# Patient Record
Sex: MALE | Race: OTHER | NOT HISPANIC OR LATINO
[De-identification: names, ages, dates, MRNs, and addresses within clinical notes are randomized per-mention and may not be internally consistent; named-entity substitution may affect disease eponyms.]

---

## 2020-01-13 PROBLEM — Z00.00 ENCOUNTER FOR PREVENTIVE HEALTH EXAMINATION: Status: ACTIVE | Noted: 2020-01-13

## 2020-01-14 ENCOUNTER — APPOINTMENT (OUTPATIENT)
Dept: ORTHOPEDIC SURGERY | Facility: CLINIC | Age: 65
End: 2020-01-14
Payer: COMMERCIAL

## 2020-01-14 VITALS — WEIGHT: 210 LBS | BODY MASS INDEX: 29.4 KG/M2 | HEIGHT: 71 IN

## 2020-01-14 DIAGNOSIS — Z86.69 PERSONAL HISTORY OF OTHER DISEASES OF THE NERVOUS SYSTEM AND SENSE ORGANS: ICD-10-CM

## 2020-01-14 DIAGNOSIS — Z82.61 FAMILY HISTORY OF ARTHRITIS: ICD-10-CM

## 2020-01-14 DIAGNOSIS — Z86.39 PERSONAL HISTORY OF OTHER ENDOCRINE, NUTRITIONAL AND METABOLIC DISEASE: ICD-10-CM

## 2020-01-14 DIAGNOSIS — Z87.39 PERSONAL HISTORY OF OTHER DISEASES OF THE MUSCULOSKELETAL SYSTEM AND CONNECTIVE TISSUE: ICD-10-CM

## 2020-01-14 DIAGNOSIS — Z72.3 LACK OF PHYSICAL EXERCISE: ICD-10-CM

## 2020-01-14 PROCEDURE — 99215 OFFICE O/P EST HI 40 MIN: CPT

## 2020-01-14 PROCEDURE — 72100 X-RAY EXAM L-S SPINE 2/3 VWS: CPT

## 2020-01-14 RX ORDER — METFORMIN HYDROCHLORIDE 1000 MG/1
1000 TABLET, COATED ORAL
Refills: 0 | Status: ACTIVE | COMMUNITY

## 2020-01-14 RX ORDER — SITAGLIPTIN 50 MG/1
50 TABLET, FILM COATED ORAL
Refills: 0 | Status: ACTIVE | COMMUNITY

## 2020-01-14 RX ORDER — MELOXICAM 7.5 MG/1
7.5 TABLET ORAL
Refills: 0 | Status: ACTIVE | COMMUNITY

## 2020-01-14 RX ORDER — GABAPENTIN 100 MG/1
CAPSULE ORAL
Refills: 0 | Status: ACTIVE | COMMUNITY

## 2020-01-14 NOTE — PHYSICAL EXAM
[de-identified] : General: No acute distress, conversant, well-nourished.\par Head: Normocephalic, atraumatic\par Neck: trachea midline, FROM\par Heart: normotensive and normal rate and rhythm\par Lungs: No labored breathing\par Skin: No abrasions, no rashes, no edema\par Psych: Alert and oriented to person, place and time\par Extremities: no peripheral edema or digital cyanosis\par Gait: Normal gait. Can perform tandem gait.  \par Vascular: warm and well perfused distally, palpable distal pulses\par \par MSK:\par Lumbar spine:\par Alignment normal.\par No tenderness to palpation.  No step-off, no deformity.\par \par NEURO EXAM:\par Sensation \par Left L2  -  2/2            \par Left L3  -  2/2\par Left L4  -  2/2\par Left L5  -  2/2\par Left S1  -  2/2\par \par Right L2  -  2/2            \par Right L3  -  2/2\par Right L4  -  2/2\par Right L5  -  2/2\par Right S1  -  2/2\par \par Motor: \par Left L2 (hip flexion)                            5/5                \par Left L3 (knee extension)                   5/5                \par Left L4 (ankle dorsiflexion)                 5/5                \par Left L5 (long toe extensor)                5/5                \par Left S1 (ankle plantar flexion)           5/5\par \par Right L2 (hip flexion)                            5/5                \par Right L3 (knee extension)                   5/5                \par Right L4 (ankle dorsiflexion)                 5/5                \par Right L5 (long toe extensor)                5/5                \par Right S1 (ankle plantar flexion)           5/5\par \par Reflexes: Normal and symmetric\par Negative clonus.  Down-going Babinski.  \par \par  [de-identified] : Lumbar radiographs obtained in the office today shows no fracture or dislocation.  Lumbar spondylosis.  L5-S1 loss of disc height.

## 2020-01-14 NOTE — HISTORY OF PRESENT ILLNESS
[de-identified] : 64 year old male with low back pain that radiates into his bilateral thighs. He says it is worse in the morning but the pain is constant.  He also has pain and paresthesias that radiating down the back of his legs.  He was previously treated by his PCP who put him on Meloxicam which he says doesn't help.  He finds the pain is limiting his mobility.  He says it has been going on for about a year but has gotten progressively worse.

## 2020-01-14 NOTE — ASSESSMENT
[FreeTextEntry1] : 64 year old male with symptoms consistent with neurogenic claudication and lumbar radiculopathy.  Patient has tried meloxicam without success.  He will stop the meloxicam and start diclofenac instead.  He was given a referral for physical therapy.  He was given a referral for a lumbar MRI.  He will followup once the MRI has been completed.  He knows to call with any questions or concerns or if his symptoms acutely worsen.

## 2020-01-31 ENCOUNTER — APPOINTMENT (OUTPATIENT)
Dept: ORTHOPEDIC SURGERY | Facility: CLINIC | Age: 65
End: 2020-01-31
Payer: COMMERCIAL

## 2020-01-31 PROCEDURE — 99213 OFFICE O/P EST LOW 20 MIN: CPT

## 2020-01-31 PROCEDURE — 72100 X-RAY EXAM L-S SPINE 2/3 VWS: CPT

## 2020-02-10 ENCOUNTER — RX RENEWAL (OUTPATIENT)
Age: 65
End: 2020-02-10

## 2020-03-13 ENCOUNTER — RX RENEWAL (OUTPATIENT)
Age: 65
End: 2020-03-13

## 2020-04-10 ENCOUNTER — RX RENEWAL (OUTPATIENT)
Age: 65
End: 2020-04-10

## 2020-04-10 RX ORDER — DICLOFENAC SODIUM 75 MG/1
75 TABLET, DELAYED RELEASE ORAL
Qty: 60 | Refills: 0 | Status: ACTIVE | COMMUNITY
Start: 2020-01-14 | End: 1900-01-01

## 2021-04-05 ENCOUNTER — APPOINTMENT (OUTPATIENT)
Dept: ORTHOPEDIC SURGERY | Facility: CLINIC | Age: 66
End: 2021-04-05
Payer: COMMERCIAL

## 2021-04-05 VITALS — HEIGHT: 60 IN | WEIGHT: 211 LBS | BODY MASS INDEX: 41.43 KG/M2

## 2021-04-05 PROCEDURE — 73521 X-RAY EXAM HIPS BI 2 VIEWS: CPT

## 2021-04-05 PROCEDURE — 99072 ADDL SUPL MATRL&STAF TM PHE: CPT

## 2021-04-05 PROCEDURE — 99214 OFFICE O/P EST MOD 30 MIN: CPT

## 2021-04-05 RX ORDER — LIDOCAINE 4 G/100G
4 PATCH TOPICAL
Qty: 30 | Refills: 0 | Status: ACTIVE | COMMUNITY
Start: 2020-12-21

## 2021-04-05 RX ORDER — METHYLPREDNISOLONE 4 MG/1
4 TABLET ORAL
Qty: 1 | Refills: 0 | Status: ACTIVE | COMMUNITY
Start: 2021-04-05 | End: 1900-01-01

## 2021-04-05 RX ORDER — MELOXICAM 15 MG/1
15 TABLET ORAL
Qty: 30 | Refills: 0 | Status: ACTIVE | COMMUNITY
Start: 2020-12-21

## 2021-04-05 RX ORDER — SITAGLIPTIN 100 MG/1
100 TABLET, FILM COATED ORAL
Qty: 30 | Refills: 0 | Status: ACTIVE | COMMUNITY
Start: 2021-01-09

## 2021-04-05 RX ORDER — LISINOPRIL 20 MG/1
20 TABLET ORAL
Qty: 30 | Refills: 0 | Status: ACTIVE | COMMUNITY
Start: 2021-01-09

## 2021-04-05 NOTE — HISTORY OF PRESENT ILLNESS
[de-identified] : First visit for this gentleman he is complaining of chronic low back pain with radiating heaviness and numbness in her left thigh. This ongoing for a few years he is not certain that this issue. She specifically has an

## 2021-04-05 NOTE — DISCUSSION/SUMMARY
[Medication Risks Reviewed] : Medication risks reviewed [de-identified] : Patient's clinical exam history and radiographs are consistent with spinal stenosis. We talked about options a Medrol Dosepak Radha is discussed he does not improve I recommended he return for the neurosurgeon.

## 2021-04-05 NOTE — PHYSICAL EXAM
[de-identified] : On exam today patient's full passive internal and external rotation of both hips at 90° with no restriction or pain. He is neurovascularly intact distally negative straight leg test quad tone is good. A lumbar exam patient does have some mild paraspinal tenderness to the lower lumbar segments. [de-identified] : AP and lateral of both hips were obtained show well-preserved joint spaces no bony abnormalities. Cartilage is well maintained

## 2021-04-05 NOTE — REASON FOR VISIT
[Follow-Up Visit] : a follow-up visit for [FreeTextEntry2] : ALEJANDRO HIP PAIN AND CRAMPS DOWN LEGS AND THIGH - SAW DR. LEÓN LAST YEAR FOR LUMBAR SPINE - SENT FOR HIP XRAYS

## 2021-05-06 ENCOUNTER — APPOINTMENT (OUTPATIENT)
Dept: ORTHOPEDIC SURGERY | Facility: CLINIC | Age: 66
End: 2021-05-06
Payer: COMMERCIAL

## 2021-05-06 PROCEDURE — 99072 ADDL SUPL MATRL&STAF TM PHE: CPT

## 2021-05-06 PROCEDURE — 99214 OFFICE O/P EST MOD 30 MIN: CPT

## 2021-05-06 RX ORDER — PREGABALIN 75 MG/1
75 CAPSULE ORAL
Qty: 60 | Refills: 1 | Status: ACTIVE | COMMUNITY
Start: 2021-05-06 | End: 1900-01-01

## 2021-05-06 NOTE — PHYSICAL EXAM
[de-identified] : General: No acute distress, conversant, well-nourished.\par Head: Normocephalic, atraumatic\par Neck: trachea midline, FROM\par Heart: normotensive and normal rate and rhythm\par Lungs: No labored breathing\par Skin: No abrasions, no rashes, no edema\par Psych: Alert and oriented to person, place and time\par Extremities: no peripheral edema or digital cyanosis\par Gait: Normal gait. Can perform tandem gait. \par Vascular: warm and well perfused distally, palpable distal pulses\par \par MSK:\par Lumbar spine:\par No tenderness to palpation. No step-off, no deformity.\par \par NEURO EXAM:\par Sensation \par Left L2 - 2/2 \par Left L3 - 2/2\par Left L4 - 2/2\par Left L5 - 2/2\par Left S1 - 2/2\par \par Right L2 - 2/2 \par Right L3 - 2/2\par Right L4 - 2/2\par Right L5 - 2/2\par Right S1 - 2/2\par \par Motor: \par Left L2 (hip flexion)  5/5 \par Left L3 (knee extension)  5/5 \par Left L4 (ankle dorsiflexion)  5/5 \par Left L5 (long toe extensor) 5/5 \par Left S1 (ankle plantar flexion) 5/5\par \par Right L2 (hip flexion)  5/5 \par Right L3 (knee extension)  5/5 \par Right L4 (ankle dorsiflexion)  5/5 \par Right L5 (long toe extensor) 5/5 \par Right S1 (ankle plantar flexion) 5/5\par \par Reflexes: Normal and symmetric\par Negative clonus. Down-going Babinski.  [de-identified] : Lumbar MRI shows lumbar spondylosis.  L3-L4 moderate-severe central stenosis.  \par \par Lumbar flex and extension radiographs obtained in the office today shows no dynamic instability. There is lumbar spondylosis and loss of disc height at L5-S1.

## 2021-05-06 NOTE — HISTORY OF PRESENT ILLNESS
[de-identified] : 64 year old male followup for acute exacerbation of low back pain radiating into his lower extremities.  Since his last visit he reports that the pain radiating into his thighs has worsened.  The back pain doesn't bother him but the leg pain is interfering with his quality of life due to its severity.  It is worse with walking and he says he has to push through the pain.  He denies radicular pain, recent illness, fevers, numbness, weakness, balance problems, saddle anesthesia, urinary retention or fecal incontinence.  He has tried PT and medications (including Diclofenac and gabapentin) without relief.  He would like definitive management. \par PCP: Swapna Alba NP\par PCP: Dr Tevin Metcalf

## 2021-05-06 NOTE — ASSESSMENT
[FreeTextEntry1] : 64 year old male followup for acute exacerbation of low back pain radiating into his thighs.  He has L3-L4 lumbar stenosis and neurogenic claudication.  He is otherwise neurologically intact.  Since his last visit he has had significant worsening of the pain radiating to his thighs.  It is worse with walking and better with rest. He has minimal low back pain.  He has tried PT, Diclofenac and gabapentin without relief.  We discussed additional treatment options including spinal injections, medications and surgery.  Surgery would be L3-L4 laminectomy and partial bilateral facetectomies.  We discussed the risks and benefits.  The risks include anesthesia, blood loss, need for blood transfusion, clots, stroke, myocardial infarct, chronic pain, loss of function, need for reoperation, Covid-19, durotomy, injection and need for reoperation.  The patient understands the risks and benefits.  He asked several great questions all of which were answered.  He elects to proceed with surgical intervention.  He will be sent for a new lumbar MRI for surgical planning since his last MRI was from 2019.  He will be scheduled for surgery.  He will require medical clearance and a Covid-19 test prior to surgery. We discussed red flag symptoms that would require emergent evaluation. He knows to call with any questions or concerns or if his symptoms acutely worsen.

## 2021-05-06 NOTE — ASSESSMENT
[FreeTextEntry1] : 64 year old male with lumbar stenosis and neurogenic claudication.  He is otherwise neurologically intact.  The patient has tried diclofenac and gabapentin without relief.  He has tried PT.  We discussed his MRI findings and discussed possible interventions. We discussed lumbar epidural steroid injections as well as surgical decompression (laminectomy).  The patient would like time to think about his options.  He will call to make a followup appointment.  He knows to call with any questions or concerns or if his symptoms acutely worsen.

## 2021-05-06 NOTE — HISTORY OF PRESENT ILLNESS
[de-identified] : 64 year old male returns today for followup of low back pain radiating into his thighs.  He says when he walks his thigh pain and cramps force him to stop walking.  When he stops to sit and rest the leg pain resolves. He says it is interfering with his quality of life.  Patient has a recent MRI completed at an outside facility.  He denies any balance problems or urinary retention.

## 2021-05-06 NOTE — PHYSICAL EXAM
[de-identified] : General: No acute distress, conversant, well-nourished.\par Head: Normocephalic, atraumatic\par Neck: trachea midline, FROM\par Heart: normotensive and normal rate and rhythm\par Lungs: No labored breathing\par Skin: No abrasions, no rashes, no edema\par Psych: Alert and oriented to person, place and time\par Extremities: no peripheral edema or digital cyanosis\par Gait: Normal gait. Can perform tandem gait. \par Vascular: warm and well perfused distally, palpable distal pulses\par \par MSK:\par Lumbar spine:\par No tenderness to palpation. No step-off, no deformity.\par \par NEURO EXAM:\par Sensation \par Left L2 - 2/2 \par Left L3 - 2/2\par Left L4 - 2/2\par Left L5 - 2/2\par Left S1 - 2/2\par \par Right L2 - 2/2 \par Right L3 - 2/2\par Right L4 - 2/2\par Right L5 - 2/2\par Right S1 - 2/2\par \par Motor: \par Left L2 (hip flexion)  5/5 \par Left L3 (knee extension)  5/5 \par Left L4 (ankle dorsiflexion)  5/5 \par Left L5 (long toe extensor) 5/5 \par Left S1 (ankle plantar flexion) 5/5\par \par Right L2 (hip flexion)  5/5 \par Right L3 (knee extension)  5/5 \par Right L4 (ankle dorsiflexion)  5/5 \par Right L5 (long toe extensor) 5/5 \par Right S1 (ankle plantar flexion) 5/5\par \par Reflexes: Normal and symmetric\par Negative clonus. Down-going Babinski.  [de-identified] : Lumbar MRI shows lumbar spondylosis.  L3-L4 moderate-severe central stenosis.  \par \par Lumbar flex and extension radiographs obtained in the office today shows no dynamic instability. There is lumbar spondylosis and loss of disc height at L5-S1.

## 2021-05-20 ENCOUNTER — APPOINTMENT (OUTPATIENT)
Dept: ORTHOPEDIC SURGERY | Facility: CLINIC | Age: 66
End: 2021-05-20

## 2021-06-11 VITALS
DIASTOLIC BLOOD PRESSURE: 82 MMHG | RESPIRATION RATE: 16 BRPM | HEIGHT: 71 IN | OXYGEN SATURATION: 99 % | TEMPERATURE: 98 F | HEART RATE: 76 BPM | SYSTOLIC BLOOD PRESSURE: 129 MMHG | WEIGHT: 210.32 LBS

## 2021-06-11 RX ORDER — OMEPRAZOLE 10 MG/1
1 CAPSULE, DELAYED RELEASE ORAL
Qty: 0 | Refills: 0 | DISCHARGE

## 2021-06-11 NOTE — H&P ADULT - NSHPPHYSICALEXAM_GEN_ALL_CORE
PE:   Msk: Decreased ROM to lumbar spine secondary to pain  Rest of PE per medical clearance. NAD, sitting comfortably in chair   MSK: decreased ROM of lumbar spine secondary to pain, SILT BLE, 5/5 EHL/FHL/TA/GS RLE, 5/5 FHL/TA/GS LLE, 4/5 EHL LLE    Rest of PE per medical clearance

## 2021-06-11 NOTE — H&P ADULT - NSICDXPASTMEDICALHX_GEN_ALL_CORE_FT
PAST MEDICAL HISTORY:  DM (diabetes mellitus)     Dyslipidemia     Gastric reflux     Hepatitis C     HTN (hypertension)     Lumbar radiculopathy     Pancreatitis

## 2021-06-11 NOTE — H&P ADULT - PROBLEM SELECTOR PLAN 1
Admit to Orthopaedic Service.  Presents today for elective   Pt medically stable and cleared for procedure today by  Admit to Orthopaedic Service.  Presents today for elective   Pt medically stable and cleared for procedure today by Dr. Metcalf Admit to Orthopaedic Service.  Presents today for elective L2-L4 laminectomy with partial facetectomies   Pt medically stable and cleared for procedure today by Dr. Metcalf

## 2021-06-11 NOTE — H&P ADULT - NSHPLABSRESULTS_GEN_ALL_CORE
history of covid vaccine x 2   Cr 1.12   preop cbc/cmp/ua/pt/inr/ptt - within normal limits, reviewed by medical clearance   Preop EKG: NSR, reviewed by medical clearance.   Povidone iodine nasal swab to be given day of surgery COVID vaccine #2 on 02/11/2021  Cr 1.12   preop cbc/cmp/ua/pt/inr/ptt - within normal limits, reviewed by medical clearance   Preop EKG: NSR, reviewed by medical clearance.   Povidone iodine nasal swab to be given day of surgery

## 2021-06-11 NOTE — H&P ADULT - HISTORY OF PRESENT ILLNESS
66M with low back pain x  Presents today for L2-L4 laminectomy with partial facetectomies  66M with low back pain x years with radiation bilaterally with intermittent numbness/weakness worsened over time without improvement. Failed conservative treatments. Denies numbness, tingling. Ambulates without assist. Pt denies any recent fevers/chills, chest pain, body aches, shortness of breath, sick contacts. Denies history of DVT/PE. Presents today for elective L2-L4 laminectomy with partial facetectomies.

## 2021-06-12 RX ORDER — POVIDONE-IODINE 5 %
1 AEROSOL (ML) TOPICAL ONCE
Refills: 0 | Status: DISCONTINUED | OUTPATIENT
Start: 2021-06-14 | End: 2021-06-14

## 2021-06-13 ENCOUNTER — TRANSCRIPTION ENCOUNTER (OUTPATIENT)
Age: 66
End: 2021-06-13

## 2021-06-14 ENCOUNTER — APPOINTMENT (OUTPATIENT)
Dept: ORTHOPEDIC SURGERY | Facility: HOSPITAL | Age: 66
End: 2021-06-14

## 2021-06-14 ENCOUNTER — INPATIENT (INPATIENT)
Facility: HOSPITAL | Age: 66
LOS: 1 days | Discharge: HOME | DRG: 520 | End: 2021-06-16
Attending: STUDENT IN AN ORGANIZED HEALTH CARE EDUCATION/TRAINING PROGRAM | Admitting: STUDENT IN AN ORGANIZED HEALTH CARE EDUCATION/TRAINING PROGRAM
Payer: COMMERCIAL

## 2021-06-14 DIAGNOSIS — E11.9 TYPE 2 DIABETES MELLITUS WITHOUT COMPLICATIONS: ICD-10-CM

## 2021-06-14 DIAGNOSIS — M48.061 SPINAL STENOSIS, LUMBAR REGION WITHOUT NEUROGENIC CLAUDICATION: ICD-10-CM

## 2021-06-14 DIAGNOSIS — K21.9 GASTRO-ESOPHAGEAL REFLUX DISEASE WITHOUT ESOPHAGITIS: ICD-10-CM

## 2021-06-14 DIAGNOSIS — I10 ESSENTIAL (PRIMARY) HYPERTENSION: ICD-10-CM

## 2021-06-14 DIAGNOSIS — B19.20 UNSPECIFIED VIRAL HEPATITIS C WITHOUT HEPATIC COMA: ICD-10-CM

## 2021-06-14 DIAGNOSIS — E78.5 HYPERLIPIDEMIA, UNSPECIFIED: ICD-10-CM

## 2021-06-14 LAB
GLUCOSE BLDC GLUCOMTR-MCNC: 129 MG/DL — HIGH (ref 70–99)
GLUCOSE BLDC GLUCOMTR-MCNC: 204 MG/DL — HIGH (ref 70–99)
GLUCOSE BLDC GLUCOMTR-MCNC: 214 MG/DL — HIGH (ref 70–99)

## 2021-06-14 PROCEDURE — 63047 LAM FACETEC & FORAMOT LUMBAR: CPT

## 2021-06-14 PROCEDURE — 63048 LAM FACETEC &FORAMOT EA ADDL: CPT

## 2021-06-14 RX ORDER — DEXTROSE 50 % IN WATER 50 %
12.5 SYRINGE (ML) INTRAVENOUS ONCE
Refills: 0 | Status: DISCONTINUED | OUTPATIENT
Start: 2021-06-14 | End: 2021-06-16

## 2021-06-14 RX ORDER — ACETAMINOPHEN 500 MG
1000 TABLET ORAL EVERY 8 HOURS
Refills: 0 | Status: DISCONTINUED | OUTPATIENT
Start: 2021-06-14 | End: 2021-06-16

## 2021-06-14 RX ORDER — METFORMIN HYDROCHLORIDE 850 MG/1
1 TABLET ORAL
Qty: 0 | Refills: 0 | DISCHARGE

## 2021-06-14 RX ORDER — DEXTROSE 50 % IN WATER 50 %
25 SYRINGE (ML) INTRAVENOUS ONCE
Refills: 0 | Status: DISCONTINUED | OUTPATIENT
Start: 2021-06-14 | End: 2021-06-16

## 2021-06-14 RX ORDER — HYDROMORPHONE HYDROCHLORIDE 2 MG/ML
0.5 INJECTION INTRAMUSCULAR; INTRAVENOUS; SUBCUTANEOUS
Refills: 0 | Status: DISCONTINUED | OUTPATIENT
Start: 2021-06-14 | End: 2021-06-16

## 2021-06-14 RX ORDER — SITAGLIPTIN 50 MG/1
1 TABLET, FILM COATED ORAL
Qty: 0 | Refills: 0 | DISCHARGE

## 2021-06-14 RX ORDER — ONDANSETRON 8 MG/1
4 TABLET, FILM COATED ORAL EVERY 6 HOURS
Refills: 0 | Status: DISCONTINUED | OUTPATIENT
Start: 2021-06-14 | End: 2021-06-16

## 2021-06-14 RX ORDER — CEFAZOLIN SODIUM 1 G
2000 VIAL (EA) INJECTION EVERY 8 HOURS
Refills: 0 | Status: DISCONTINUED | OUTPATIENT
Start: 2021-06-14 | End: 2021-06-14

## 2021-06-14 RX ORDER — LISINOPRIL 2.5 MG/1
1 TABLET ORAL
Qty: 0 | Refills: 0 | DISCHARGE

## 2021-06-14 RX ORDER — SODIUM CHLORIDE 9 MG/ML
1000 INJECTION, SOLUTION INTRAVENOUS
Refills: 0 | Status: DISCONTINUED | OUTPATIENT
Start: 2021-06-14 | End: 2021-06-16

## 2021-06-14 RX ORDER — GABAPENTIN 400 MG/1
300 CAPSULE ORAL ONCE
Refills: 0 | Status: COMPLETED | OUTPATIENT
Start: 2021-06-14 | End: 2021-06-14

## 2021-06-14 RX ORDER — DEXTROSE 50 % IN WATER 50 %
15 SYRINGE (ML) INTRAVENOUS ONCE
Refills: 0 | Status: DISCONTINUED | OUTPATIENT
Start: 2021-06-14 | End: 2021-06-16

## 2021-06-14 RX ORDER — INSULIN LISPRO 100/ML
VIAL (ML) SUBCUTANEOUS
Refills: 0 | Status: DISCONTINUED | OUTPATIENT
Start: 2021-06-14 | End: 2021-06-16

## 2021-06-14 RX ORDER — ACETAMINOPHEN 500 MG
1000 TABLET ORAL ONCE
Refills: 0 | Status: COMPLETED | OUTPATIENT
Start: 2021-06-14 | End: 2021-06-14

## 2021-06-14 RX ORDER — MAGNESIUM HYDROXIDE 400 MG/1
30 TABLET, CHEWABLE ORAL EVERY 12 HOURS
Refills: 0 | Status: DISCONTINUED | OUTPATIENT
Start: 2021-06-14 | End: 2021-06-16

## 2021-06-14 RX ORDER — CEFAZOLIN SODIUM 1 G
2000 VIAL (EA) INJECTION EVERY 8 HOURS
Refills: 0 | Status: COMPLETED | OUTPATIENT
Start: 2021-06-14 | End: 2021-06-15

## 2021-06-14 RX ORDER — SENNA PLUS 8.6 MG/1
2 TABLET ORAL AT BEDTIME
Refills: 0 | Status: DISCONTINUED | OUTPATIENT
Start: 2021-06-14 | End: 2021-06-16

## 2021-06-14 RX ORDER — ASPIRIN/CALCIUM CARB/MAGNESIUM 324 MG
81 TABLET ORAL DAILY
Refills: 0 | Status: DISCONTINUED | OUTPATIENT
Start: 2021-06-15 | End: 2021-06-16

## 2021-06-14 RX ORDER — OXYCODONE HYDROCHLORIDE 5 MG/1
5 TABLET ORAL EVERY 4 HOURS
Refills: 0 | Status: DISCONTINUED | OUTPATIENT
Start: 2021-06-14 | End: 2021-06-15

## 2021-06-14 RX ORDER — BUPIVACAINE 13.3 MG/ML
20 INJECTION, SUSPENSION, LIPOSOMAL INFILTRATION ONCE
Refills: 0 | Status: DISCONTINUED | OUTPATIENT
Start: 2021-06-14 | End: 2021-06-16

## 2021-06-14 RX ORDER — METHOCARBAMOL 500 MG/1
500 TABLET, FILM COATED ORAL EVERY 8 HOURS
Refills: 0 | Status: DISCONTINUED | OUTPATIENT
Start: 2021-06-14 | End: 2021-06-16

## 2021-06-14 RX ORDER — METOCLOPRAMIDE HCL 10 MG
10 TABLET ORAL EVERY 8 HOURS
Refills: 0 | Status: DISCONTINUED | OUTPATIENT
Start: 2021-06-14 | End: 2021-06-16

## 2021-06-14 RX ORDER — APREPITANT 80 MG/1
40 CAPSULE ORAL ONCE
Refills: 0 | Status: COMPLETED | OUTPATIENT
Start: 2021-06-14 | End: 2021-06-14

## 2021-06-14 RX ORDER — ASPIRIN/CALCIUM CARB/MAGNESIUM 324 MG
0 TABLET ORAL
Qty: 0 | Refills: 0 | DISCHARGE

## 2021-06-14 RX ORDER — GLUCAGON INJECTION, SOLUTION 0.5 MG/.1ML
1 INJECTION, SOLUTION SUBCUTANEOUS ONCE
Refills: 0 | Status: DISCONTINUED | OUTPATIENT
Start: 2021-06-14 | End: 2021-06-16

## 2021-06-14 RX ADMIN — Medication: at 14:34

## 2021-06-14 RX ADMIN — GABAPENTIN 300 MILLIGRAM(S): 400 CAPSULE ORAL at 07:23

## 2021-06-14 RX ADMIN — OXYCODONE HYDROCHLORIDE 5 MILLIGRAM(S): 5 TABLET ORAL at 20:22

## 2021-06-14 RX ADMIN — Medication 4: at 22:04

## 2021-06-14 RX ADMIN — Medication 1000 MILLIGRAM(S): at 16:27

## 2021-06-14 RX ADMIN — Medication 2000 MILLIGRAM(S): at 21:27

## 2021-06-14 RX ADMIN — Medication 1000 MILLIGRAM(S): at 22:26

## 2021-06-14 RX ADMIN — Medication 1000 MILLIGRAM(S): at 07:21

## 2021-06-14 RX ADMIN — HYDROMORPHONE HYDROCHLORIDE 0.5 MILLIGRAM(S): 2 INJECTION INTRAMUSCULAR; INTRAVENOUS; SUBCUTANEOUS at 15:30

## 2021-06-14 RX ADMIN — OXYCODONE HYDROCHLORIDE 5 MILLIGRAM(S): 5 TABLET ORAL at 21:22

## 2021-06-14 RX ADMIN — Medication 1000 MILLIGRAM(S): at 21:26

## 2021-06-14 RX ADMIN — HYDROMORPHONE HYDROCHLORIDE 0.5 MILLIGRAM(S): 2 INJECTION INTRAMUSCULAR; INTRAVENOUS; SUBCUTANEOUS at 17:30

## 2021-06-14 RX ADMIN — Medication 50 MILLIGRAM(S): at 16:26

## 2021-06-14 RX ADMIN — APREPITANT 40 MILLIGRAM(S): 80 CAPSULE ORAL at 07:21

## 2021-06-14 RX ADMIN — Medication 50 MILLIGRAM(S): at 21:26

## 2021-06-14 RX ADMIN — METHOCARBAMOL 500 MILLIGRAM(S): 500 TABLET, FILM COATED ORAL at 21:26

## 2021-06-14 RX ADMIN — SENNA PLUS 2 TABLET(S): 8.6 TABLET ORAL at 21:26

## 2021-06-14 RX ADMIN — HYDROMORPHONE HYDROCHLORIDE 0.5 MILLIGRAM(S): 2 INJECTION INTRAMUSCULAR; INTRAVENOUS; SUBCUTANEOUS at 17:14

## 2021-06-14 RX ADMIN — METHOCARBAMOL 500 MILLIGRAM(S): 500 TABLET, FILM COATED ORAL at 16:25

## 2021-06-14 RX ADMIN — Medication 1000 MILLIGRAM(S): at 16:30

## 2021-06-14 NOTE — BRIEF OPERATIVE NOTE - NSICDXBRIEFPOSTOP_GEN_ALL_CORE_FT
POST-OP DIAGNOSIS:  Spinal stenosis of lumbar region, unspecified whether neurogenic claudication present 14-Jun-2021 13:45:11 Spinal stenosis of lumbar region, unspecified whether neurogenic claudication present Nehal Lee

## 2021-06-14 NOTE — PROGRESS NOTE ADULT - SUBJECTIVE AND OBJECTIVE BOX
Ortho Post Op Check    Procedure: L2-4 Lami/partial facetectomy   Surgeon: Dileep     Patient seen and examined at bedside in the PACU   Pt notes pain lumbar spine 8/10   Denies CP, SOB, N/V, numbness/tingling     Vital Signs Last 24 Hrs  T(C): 35.7 (06-14-21 @ 13:05), Max: 35.7 (06-14-21 @ 13:05)  T(F): 96.3 (06-14-21 @ 13:05), Max: 96.3 (06-14-21 @ 13:05)  HR: 81 (06-14-21 @ 14:20) (81 - 94)  BP: 128/76 (06-14-21 @ 14:20) (122/72 - 129/76)  BP(mean): 94 (06-14-21 @ 14:20) (91 - 97)  RR: 14 (06-14-21 @ 14:20) (14 - 17)  SpO2: 100% (06-14-21 @ 14:20) (100% - 100%)  AVSS    General: Pt Alert and oriented, NAD  Dressing C/D/I lumbar spine, HV x1 in place   Pulses: 2+ DP   Sensation: intact to light touch   cooper in place   Motor: EHL/FHL/TA/GS 4+/5 EHL LLE, remainder 5/5         A/P: 66yMale POD#0 s/p L2-4 lami/partial facetecomy     - Stable in PACU   - Pain Control: IV and PO PRN   - DVT ppx: SCDs  - Post op abx: ancef 2g post op   - PT, WBS: WBAT, PT. admit, drain management  - d/c cooper in AM       Ortho Pager [phone]

## 2021-06-14 NOTE — BRIEF OPERATIVE NOTE - NSICDXBRIEFPREOP_GEN_ALL_CORE_FT
PRE-OP DIAGNOSIS:  Spinal stenosis of lumbar region, unspecified whether neurogenic claudication present 14-Jun-2021 13:44:23 Spinal stenosis of lumbar region, unspecified whether neurogenic claudication present Nehal Lee

## 2021-06-14 NOTE — PACU DISCHARGE NOTE - COMMENTS
PACU criteria met. Report given to Halina Aleman RN. Patient to be transported to [address] [place] [address] via bed by CNA.

## 2021-06-15 LAB
A1C WITH ESTIMATED AVERAGE GLUCOSE RESULT: 7 % — HIGH (ref 4–5.6)
ANION GAP SERPL CALC-SCNC: 12 MMOL/L — SIGNIFICANT CHANGE UP (ref 5–17)
BASOPHILS # BLD AUTO: 0.01 K/UL — SIGNIFICANT CHANGE UP (ref 0–0.2)
BASOPHILS NFR BLD AUTO: 0.1 % — SIGNIFICANT CHANGE UP (ref 0–2)
BUN SERPL-MCNC: 14 MG/DL — SIGNIFICANT CHANGE UP (ref 7–23)
CALCIUM SERPL-MCNC: 9.5 MG/DL — SIGNIFICANT CHANGE UP (ref 8.4–10.5)
CHLORIDE SERPL-SCNC: 107 MMOL/L — SIGNIFICANT CHANGE UP (ref 96–108)
CO2 SERPL-SCNC: 21 MMOL/L — LOW (ref 22–31)
COVID-19 SPIKE DOMAIN AB INTERP: POSITIVE
COVID-19 SPIKE DOMAIN ANTIBODY RESULT: >250 U/ML — HIGH
CREAT SERPL-MCNC: 1.05 MG/DL — SIGNIFICANT CHANGE UP (ref 0.5–1.3)
EOSINOPHIL # BLD AUTO: 0.01 K/UL — SIGNIFICANT CHANGE UP (ref 0–0.5)
EOSINOPHIL NFR BLD AUTO: 0.1 % — SIGNIFICANT CHANGE UP (ref 0–6)
ESTIMATED AVERAGE GLUCOSE: 154 MG/DL — HIGH (ref 68–114)
GLUCOSE BLDC GLUCOMTR-MCNC: 137 MG/DL — HIGH (ref 70–99)
GLUCOSE BLDC GLUCOMTR-MCNC: 167 MG/DL — HIGH (ref 70–99)
GLUCOSE BLDC GLUCOMTR-MCNC: 234 MG/DL — HIGH (ref 70–99)
GLUCOSE BLDC GLUCOMTR-MCNC: 237 MG/DL — HIGH (ref 70–99)
GLUCOSE SERPL-MCNC: 146 MG/DL — HIGH (ref 70–99)
HCT VFR BLD CALC: 36.8 % — LOW (ref 39–50)
HGB BLD-MCNC: 12.7 G/DL — LOW (ref 13–17)
IMM GRANULOCYTES NFR BLD AUTO: 0.3 % — SIGNIFICANT CHANGE UP (ref 0–1.5)
LYMPHOCYTES # BLD AUTO: 1.2 K/UL — SIGNIFICANT CHANGE UP (ref 1–3.3)
LYMPHOCYTES # BLD AUTO: 13.2 % — SIGNIFICANT CHANGE UP (ref 13–44)
MCHC RBC-ENTMCNC: 29.7 PG — SIGNIFICANT CHANGE UP (ref 27–34)
MCHC RBC-ENTMCNC: 34.5 GM/DL — SIGNIFICANT CHANGE UP (ref 32–36)
MCV RBC AUTO: 86 FL — SIGNIFICANT CHANGE UP (ref 80–100)
MONOCYTES # BLD AUTO: 0.81 K/UL — SIGNIFICANT CHANGE UP (ref 0–0.9)
MONOCYTES NFR BLD AUTO: 8.9 % — SIGNIFICANT CHANGE UP (ref 2–14)
NEUTROPHILS # BLD AUTO: 7 K/UL — SIGNIFICANT CHANGE UP (ref 1.8–7.4)
NEUTROPHILS NFR BLD AUTO: 77.4 % — HIGH (ref 43–77)
NRBC # BLD: 0 /100 WBCS — SIGNIFICANT CHANGE UP (ref 0–0)
PLATELET # BLD AUTO: 240 K/UL — SIGNIFICANT CHANGE UP (ref 150–400)
POTASSIUM SERPL-MCNC: 4.7 MMOL/L — SIGNIFICANT CHANGE UP (ref 3.5–5.3)
POTASSIUM SERPL-SCNC: 4.7 MMOL/L — SIGNIFICANT CHANGE UP (ref 3.5–5.3)
RBC # BLD: 4.28 M/UL — SIGNIFICANT CHANGE UP (ref 4.2–5.8)
RBC # FLD: 12.2 % — SIGNIFICANT CHANGE UP (ref 10.3–14.5)
SARS-COV-2 IGG+IGM SERPL QL IA: >250 U/ML — HIGH
SARS-COV-2 IGG+IGM SERPL QL IA: POSITIVE
SODIUM SERPL-SCNC: 140 MMOL/L — SIGNIFICANT CHANGE UP (ref 135–145)
WBC # BLD: 9.06 K/UL — SIGNIFICANT CHANGE UP (ref 3.8–10.5)
WBC # FLD AUTO: 9.06 K/UL — SIGNIFICANT CHANGE UP (ref 3.8–10.5)

## 2021-06-15 PROCEDURE — 99223 1ST HOSP IP/OBS HIGH 75: CPT | Mod: GC

## 2021-06-15 RX ORDER — OXYCODONE HYDROCHLORIDE 5 MG/1
5 TABLET ORAL EVERY 4 HOURS
Refills: 0 | Status: DISCONTINUED | OUTPATIENT
Start: 2021-06-15 | End: 2021-06-16

## 2021-06-15 RX ORDER — LISINOPRIL 2.5 MG/1
20 TABLET ORAL DAILY
Refills: 0 | Status: DISCONTINUED | OUTPATIENT
Start: 2021-06-16 | End: 2021-06-16

## 2021-06-15 RX ORDER — OXYCODONE HYDROCHLORIDE 5 MG/1
10 TABLET ORAL EVERY 4 HOURS
Refills: 0 | Status: DISCONTINUED | OUTPATIENT
Start: 2021-06-15 | End: 2021-06-16

## 2021-06-15 RX ORDER — POLYETHYLENE GLYCOL 3350 17 G/17G
17 POWDER, FOR SOLUTION ORAL DAILY
Refills: 0 | Status: DISCONTINUED | OUTPATIENT
Start: 2021-06-15 | End: 2021-06-16

## 2021-06-15 RX ADMIN — Medication 50 MILLIGRAM(S): at 21:08

## 2021-06-15 RX ADMIN — POLYETHYLENE GLYCOL 3350 17 GRAM(S): 17 POWDER, FOR SOLUTION ORAL at 12:07

## 2021-06-15 RX ADMIN — METHOCARBAMOL 500 MILLIGRAM(S): 500 TABLET, FILM COATED ORAL at 21:08

## 2021-06-15 RX ADMIN — OXYCODONE HYDROCHLORIDE 5 MILLIGRAM(S): 5 TABLET ORAL at 03:34

## 2021-06-15 RX ADMIN — Medication 1000 MILLIGRAM(S): at 22:08

## 2021-06-15 RX ADMIN — Medication 1000 MILLIGRAM(S): at 06:29

## 2021-06-15 RX ADMIN — OXYCODONE HYDROCHLORIDE 10 MILLIGRAM(S): 5 TABLET ORAL at 23:11

## 2021-06-15 RX ADMIN — OXYCODONE HYDROCHLORIDE 10 MILLIGRAM(S): 5 TABLET ORAL at 13:07

## 2021-06-15 RX ADMIN — Medication 1000 MILLIGRAM(S): at 15:14

## 2021-06-15 RX ADMIN — Medication 50 MILLIGRAM(S): at 14:14

## 2021-06-15 RX ADMIN — OXYCODONE HYDROCHLORIDE 5 MILLIGRAM(S): 5 TABLET ORAL at 02:34

## 2021-06-15 RX ADMIN — Medication 2: at 17:37

## 2021-06-15 RX ADMIN — OXYCODONE HYDROCHLORIDE 10 MILLIGRAM(S): 5 TABLET ORAL at 17:37

## 2021-06-15 RX ADMIN — Medication 1000 MILLIGRAM(S): at 05:29

## 2021-06-15 RX ADMIN — MAGNESIUM HYDROXIDE 30 MILLILITER(S): 400 TABLET, CHEWABLE ORAL at 21:13

## 2021-06-15 RX ADMIN — OXYCODONE HYDROCHLORIDE 10 MILLIGRAM(S): 5 TABLET ORAL at 12:07

## 2021-06-15 RX ADMIN — SENNA PLUS 2 TABLET(S): 8.6 TABLET ORAL at 21:08

## 2021-06-15 RX ADMIN — METHOCARBAMOL 500 MILLIGRAM(S): 500 TABLET, FILM COATED ORAL at 05:29

## 2021-06-15 RX ADMIN — Medication 81 MILLIGRAM(S): at 12:07

## 2021-06-15 RX ADMIN — METHOCARBAMOL 500 MILLIGRAM(S): 500 TABLET, FILM COATED ORAL at 14:14

## 2021-06-15 RX ADMIN — Medication 1000 MILLIGRAM(S): at 21:08

## 2021-06-15 RX ADMIN — Medication 2000 MILLIGRAM(S): at 05:33

## 2021-06-15 RX ADMIN — Medication 4: at 12:07

## 2021-06-15 RX ADMIN — OXYCODONE HYDROCHLORIDE 5 MILLIGRAM(S): 5 TABLET ORAL at 08:41

## 2021-06-15 RX ADMIN — Medication 1000 MILLIGRAM(S): at 14:14

## 2021-06-15 RX ADMIN — Medication 50 MILLIGRAM(S): at 05:33

## 2021-06-15 RX ADMIN — OXYCODONE HYDROCHLORIDE 10 MILLIGRAM(S): 5 TABLET ORAL at 18:37

## 2021-06-15 RX ADMIN — Medication 4: at 22:11

## 2021-06-15 RX ADMIN — OXYCODONE HYDROCHLORIDE 10 MILLIGRAM(S): 5 TABLET ORAL at 22:11

## 2021-06-15 RX ADMIN — OXYCODONE HYDROCHLORIDE 5 MILLIGRAM(S): 5 TABLET ORAL at 09:41

## 2021-06-15 NOTE — PHYSICAL THERAPY INITIAL EVALUATION ADULT - ADDITIONAL COMMENTS
Pt lives with his family in an apartment, few S[place].  Pt denies recent Assistive Device use and a recent history of falls.

## 2021-06-15 NOTE — PROGRESS NOTE ADULT - SUBJECTIVE AND OBJECTIVE BOX
Orthopaedic Spine Resident Note    S:  No acute overnight events.  Pain well controlled.    No fevers/chills/shortness of breath/chest pain/new neurologic complaints.    O:  Vital Signs Last 24 Hrs  T(C): 36.3 (15 Rayray 2021 08:39), Max: 36.7 (15 Rayray 2021 04:28)  T(F): 97.4 (15 Rayray 2021 08:39), Max: 98 (15 Rayray 2021 04:28)  HR: 94 (15 Rayray 2021 08:39) (75 - 102)  BP: 128/61 (15 Rayray 2021 08:39) (116/75 - 147/90)  BP(mean): 98 (15 Rayray 2021 04:28) (91 - 111)  RR: 18 (15 Rayray 2021 08:39) (14 - 21)  SpO2: 98% (15 Rayray 2021 08:39) (98% - 100%)    General: Well-appearing adult Male lying supine; NAD; A&Ox3; Garg in place  Back: Gauze/Tegaderm Dressing w serosanguinous drainage, dressing changed this AM  Hemovac drains x  1 with 240 cc sanguinous output since OR  Motor:  LLE- Hip Flex/Knee Ext/TA/EHL/GS 5/5 strength  RLE- Hip Flex/Knee Ext/TA/EHL/GS 5/5 strength  Sensory:  LLE- SILT L2-S1 dermatomes   RLE- SILT L2-S1 dermatomes  Vascular:  Feet WWP; DP 2+ bilaterally; Cap refill < 2 sec    I&O's Detail    14 Jun 2021 07:01  -  15 Rayray 2021 07:00  --------------------------------------------------------  IN:    Lactated Ringers: 2080 mL  Total IN: 2080 mL    OUT:    Accordian (mL): 240 mL    Estimated Blood Loss (mL): 40 mL    Indwelling Catheter - Urethral (mL): 3135 mL  Total OUT: 3415 mL    Total NET: -1335 mL          Labs:                        12.7   9.06  )-----------( 240      ( 15 Rayray 2021 05:45 )             36.8     15 Rayray 2021 05:45    140    |  107    |  14     ----------------------------<  146    4.7     |  21     |  1.05     Ca    9.5        15 Rayray 2021 05:45

## 2021-06-15 NOTE — PROGRESS NOTE ADULT - SUBJECTIVE AND OBJECTIVE BOX
Medicine Consult Initial Note:     HPI:  66M with hx of chronic low back pain, HTN, type II DM, presents for elective L2-L4 laminectomy with partial facetectomies on 6/14 without complication, now postop day 1     Reports to me mild     12 point ROS reviewed and negative except as otherwise stated in HPI and below    PAST MEDICAL & SURGICAL HISTORY:  HTN (hypertension)    DM (diabetes mellitus)    Dyslipidemia    Lumbar radiculopathy    Hepatitis C    Gastric reflux    Pancreatitis    No significant past surgical history      SOCIAL HISTORY:  Tobacco:  Alcohol:  Illicit Drugs:  Living situation:  ADLs:    FAMILY HISTORY:    ALLERGIES:  No Known Drug Allergies  seasonal...sneezing; itchy eyes (Other)      HOME MEDICATIONS:  aspirin 81 mg oral tablet: orally once a day  gabapentin 300 mg oral tablet: orally once a day  Januvia 100 mg oral tablet: 1 tab(s) orally once a day  lisinopril 20 mg oral tablet: 1 tab(s) orally once a day  metFORMIN 1000 mg oral tablet: 1 tab(s) orally 2 times a day      Vital Signs Last 24 Hrs  T(F): 97.4 (15 Rayray 2021 08:39), Max: 98 (15 Rayray 2021 04:28)  HR: 94 (15 Rayray 2021 08:39) (75 - 102)  BP: 128/61 (15 Rayray 2021 08:39) (116/75 - 147/90)  RR: 18 (15 Rayray 2021 08:39) (14 - 21)  SpO2: 98% (15 Rayray 2021 08:39) (98% - 100%)    PHYSICAL EXAM:  GENERAL: pleasant, NAD  NEURO: AOx3, intact strength and sensation UE and LE  HEAD:  Atraumatic, Normocephalic  EYES: EOMI, conjunctiva and sclera clear  ENMT: Clear op, good dentition   CHEST/LUNG: Clear to auscultation bilaterally; No rales, rhonchi, wheezing, or rubs  HEART: Regular rate and rhythm; S1/S2, No murmurs, rubs, or gallops  ABDOMEN: Soft, Nontender, Nondistended, Normal BS  EXT: No sig le edema, wwp   SKIN: Warm, Intact    LABS:                        12.7   9.06  )-----------( 240      ( 15 Rayray 2021 05:45 )             36.8     06-15    140  |  107  |  14  ----------------------------<  146  4.7   |  21  |  1.05    Ca    9.5      15 Rayray 2021 05:45                RADIOLOGY & ADDITIONAL TESTS:    ASSESSMENT AND PLAN:       #DVT px:   #Diet:     Patient was seen and examined by me at bedside    Greater than 110 minutes spent on total encounter; more than 50% of the visit was spent counseling and/or coordinating care by the attending physician.    Issa Currie MD [id number]           Medicine Consult Initial Note:     HPI:  66M with hx of chronic low back pain, HTN, type II DM, presents for elective L2-L4 laminectomy with partial facetectomies on 6/14 without complication, now postop day 1     Reports to me mild back at surgical site otherwise feeling well, passed TOV this am, no BM in past 3 days     12 point ROS reviewed and negative except as otherwise stated in HPI and below    PAST MEDICAL & SURGICAL HISTORY:  HTN (hypertension)    DM (diabetes mellitus)    Dyslipidemia    Lumbar radiculopathy    Hepatitis C    Gastric reflux    Pancreatitis    No significant past surgical history      SOCIAL HISTORY:  No toxic habits  Lives with wife   Indpt at baseline    FAMILY HISTORY:  Mother with DM  Father with MI in 50's     ALLERGIES:  No Known Drug Allergies  seasonal...sneezing; itchy eyes (Other)      HOME MEDICATIONS:  aspirin 81 mg oral tablet: orally once a day  gabapentin 300 mg oral tablet: orally once a day  Januvia 100 mg oral tablet: 1 tab(s) orally once a day  lisinopril 20 mg oral tablet: 1 tab(s) orally once a day  metFORMIN 1000 mg oral tablet: 1 tab(s) orally 2 times a day      Vital Signs Last 24 Hrs  T(F): 97.4 (15 Rayray 2021 08:39), Max: 98 (15 Rayray 2021 04:28)  HR: 94 (15 Rayray 2021 08:39) (75 - 102)  BP: 128/61 (15 Rayray 2021 08:39) (116/75 - 147/90)  RR: 18 (15 Rayray 2021 08:39) (14 - 21)  SpO2: 98% (15 Rayray 2021 08:39) (98% - 100%)    PHYSICAL EXAM:  GENERAL: pleasant, NAD  NEURO: AOx3, intact strength and sensation UE and LE  HEAD:  Atraumatic, Normocephalic  EYES: EOMI, conjunctiva and sclera clear  ENMT: Clear op, good dentition   CHEST/LUNG: Clear to auscultation bilaterally; No rales, rhonchi, wheezing, or rubs  HEART: Regular rate and rhythm; S1/S2, No murmurs, rubs, or gallops  ABDOMEN: Soft, Nontender, Nondistended, Normal BS  EXT: No sig le edema, wwp   Back: Drain in place at surgical site serrosang drainage, dressing site covered with gauze no bleeding     LABS:                        12.7   9.06  )-----------( 240      ( 15 Rayray 2021 05:45 )             36.8     06-15    140  |  107  |  14  ----------------------------<  146  4.7   |  21  |  1.05    Ca    9.5      15 Rayray 2021 05:45    a1c 7     RADIOLOGY & ADDITIONAL TESTS:    ASSESSMENT AND PLAN: 66M with hx of chronic low back pain, HTN, type II DM, presents for elective L2-L4 laminectomy which occurred on 6/14 without complication, now postop day 1   #Postop laminectomy- with drain, wound care and pain control per primary  #HTN- holding ACE periop, can likely restart home lisinopril tomorrow   #Type II DM on orals, a1c7- ISS while here    #DVT px: per primary team, not on px  #Diet: DM diet  #Dispo: pending PT eval today     Patient was seen and examined by me at bedside    Greater than 110 minutes spent on total encounter; more than 50% of the visit was spent counseling and/or coordinating care by the attending physician.    Issa Currie MD [id number]

## 2021-06-15 NOTE — PROGRESS NOTE ADULT - SUBJECTIVE AND OBJECTIVE BOX
Ortho Note    Pt comfortable without complaints, pain controlled  Denies CP, SOB, N/V, numbness/tingling     Vital Signs Last 24 Hrs  T(C): 36.3 (06-15-21 @ 08:39), Max: 36.3 (06-15-21 @ 08:39)  T(F): 97.4 (06-15-21 @ 08:39), Max: 97.4 (06-15-21 @ 08:39)  HR: 94 (06-15-21 @ 08:39) (94 - 94)  BP: 128/61 (06-15-21 @ 08:39) (128/61 - 128/61)  BP(mean): --  RR: 18 (06-15-21 @ 08:39) (18 - 18)  SpO2: 98% (06-15-21 @ 08:39) (98% - 98%)  AVSS    General: Pt Alert and oriented, NAD  DSG: lumbar dry dressing C/D/I, HV x1 to suction  Pulses: bilateral pedal 2+, wwp toes, cap refill < 3sec toes  Sensation: bilateral SILT  Motor: bilateral 5/5 EHL/FHL/TA/GS    06-15    140  |  107  |  14  ----------------------------<  146<H>  4.7   |  21<L>  |  1.05    Ca    9.5      15 Rayray 2021 05:45                            12.7   9.06  )-----------( 240      ( 15 Rayray 2021 05:45 )             36.8       A/P: 66y Male POD#1 s/p  L2-4 laminectomy  - Stable, afebrile, nontoxic appearance, IS encouraged  - Pain Control: po meds  - DVT ppx: SCDs, ASA 81 mg daily  - PT, WBS: WBAT spinal precautions  -monitor drains   -Bowel regimen: continue bowel regimen  -Dispo: home pending PT clearance and drain output    Ortho Pager [phone]

## 2021-06-15 NOTE — PHYSICAL THERAPY INITIAL EVALUATION ADULT - PERTINENT HX OF CURRENT PROBLEM, REHAB EVAL
66M with low back pain x years with radiation bilaterally with intermittent numbness/weakness worsened over time without improvement. Failed conservative treatments. Denies numbness, tingling. Ambulates without assist. Pt denies any recent fevers/chills, chest pain, body aches, shortness of breath, sick contacts. Denies history of DVT/PE. Presents today for elective L2-L4 laminectomy with partial facetectomies.

## 2021-06-15 NOTE — PHYSICAL THERAPY INITIAL EVALUATION ADULT - GAIT DEVIATIONS NOTED, PT EVAL
decreased geronimo/increased time in double stance/decreased step length/decreased stride length/increased stride width/decreased weight-shifting ability

## 2021-06-15 NOTE — PHYSICAL THERAPY INITIAL EVALUATION ADULT - GENERAL OBSERVATIONS, REHAB EVAL
Patient received semi-haddad in bed in NAD on RA, +SCDs, +Heplock, +hemovac. Cleared by RN. Agreeable to PT.

## 2021-06-16 ENCOUNTER — TRANSCRIPTION ENCOUNTER (OUTPATIENT)
Age: 66
End: 2021-06-16

## 2021-06-16 VITALS
RESPIRATION RATE: 16 BRPM | OXYGEN SATURATION: 95 % | SYSTOLIC BLOOD PRESSURE: 110 MMHG | HEART RATE: 104 BPM | DIASTOLIC BLOOD PRESSURE: 69 MMHG | TEMPERATURE: 99 F

## 2021-06-16 LAB
ANION GAP SERPL CALC-SCNC: 12 MMOL/L — SIGNIFICANT CHANGE UP (ref 5–17)
BASOPHILS # BLD AUTO: 0.01 K/UL — SIGNIFICANT CHANGE UP (ref 0–0.2)
BASOPHILS NFR BLD AUTO: 0.1 % — SIGNIFICANT CHANGE UP (ref 0–2)
BUN SERPL-MCNC: 16 MG/DL — SIGNIFICANT CHANGE UP (ref 7–23)
CALCIUM SERPL-MCNC: 9.5 MG/DL — SIGNIFICANT CHANGE UP (ref 8.4–10.5)
CHLORIDE SERPL-SCNC: 104 MMOL/L — SIGNIFICANT CHANGE UP (ref 96–108)
CO2 SERPL-SCNC: 23 MMOL/L — SIGNIFICANT CHANGE UP (ref 22–31)
CREAT SERPL-MCNC: 1.16 MG/DL — SIGNIFICANT CHANGE UP (ref 0.5–1.3)
EOSINOPHIL # BLD AUTO: 0.01 K/UL — SIGNIFICANT CHANGE UP (ref 0–0.5)
EOSINOPHIL NFR BLD AUTO: 0.1 % — SIGNIFICANT CHANGE UP (ref 0–6)
GLUCOSE BLDC GLUCOMTR-MCNC: 204 MG/DL — HIGH (ref 70–99)
GLUCOSE BLDC GLUCOMTR-MCNC: 225 MG/DL — HIGH (ref 70–99)
GLUCOSE SERPL-MCNC: 241 MG/DL — HIGH (ref 70–99)
HCT VFR BLD CALC: 42.9 % — SIGNIFICANT CHANGE UP (ref 39–50)
HGB BLD-MCNC: 14 G/DL — SIGNIFICANT CHANGE UP (ref 13–17)
IMM GRANULOCYTES NFR BLD AUTO: 0.4 % — SIGNIFICANT CHANGE UP (ref 0–1.5)
LYMPHOCYTES # BLD AUTO: 1.18 K/UL — SIGNIFICANT CHANGE UP (ref 1–3.3)
LYMPHOCYTES # BLD AUTO: 12.3 % — LOW (ref 13–44)
MCHC RBC-ENTMCNC: 29.2 PG — SIGNIFICANT CHANGE UP (ref 27–34)
MCHC RBC-ENTMCNC: 32.6 GM/DL — SIGNIFICANT CHANGE UP (ref 32–36)
MCV RBC AUTO: 89.6 FL — SIGNIFICANT CHANGE UP (ref 80–100)
MONOCYTES # BLD AUTO: 1.02 K/UL — HIGH (ref 0–0.9)
MONOCYTES NFR BLD AUTO: 10.6 % — SIGNIFICANT CHANGE UP (ref 2–14)
NEUTROPHILS # BLD AUTO: 7.36 K/UL — SIGNIFICANT CHANGE UP (ref 1.8–7.4)
NEUTROPHILS NFR BLD AUTO: 76.5 % — SIGNIFICANT CHANGE UP (ref 43–77)
NRBC # BLD: 0 /100 WBCS — SIGNIFICANT CHANGE UP (ref 0–0)
PLATELET # BLD AUTO: 197 K/UL — SIGNIFICANT CHANGE UP (ref 150–400)
POTASSIUM SERPL-MCNC: 4.3 MMOL/L — SIGNIFICANT CHANGE UP (ref 3.5–5.3)
POTASSIUM SERPL-SCNC: 4.3 MMOL/L — SIGNIFICANT CHANGE UP (ref 3.5–5.3)
RBC # BLD: 4.79 M/UL — SIGNIFICANT CHANGE UP (ref 4.2–5.8)
RBC # FLD: 12.3 % — SIGNIFICANT CHANGE UP (ref 10.3–14.5)
SODIUM SERPL-SCNC: 139 MMOL/L — SIGNIFICANT CHANGE UP (ref 135–145)
WBC # BLD: 9.62 K/UL — SIGNIFICANT CHANGE UP (ref 3.8–10.5)
WBC # FLD AUTO: 9.62 K/UL — SIGNIFICANT CHANGE UP (ref 3.8–10.5)

## 2021-06-16 PROCEDURE — 80048 BASIC METABOLIC PNL TOTAL CA: CPT

## 2021-06-16 PROCEDURE — 83036 HEMOGLOBIN GLYCOSYLATED A1C: CPT

## 2021-06-16 PROCEDURE — 85025 COMPLETE CBC W/AUTO DIFF WBC: CPT

## 2021-06-16 PROCEDURE — 97116 GAIT TRAINING THERAPY: CPT

## 2021-06-16 PROCEDURE — 76000 FLUOROSCOPY <1 HR PHYS/QHP: CPT

## 2021-06-16 PROCEDURE — 36415 COLL VENOUS BLD VENIPUNCTURE: CPT

## 2021-06-16 PROCEDURE — 97161 PT EVAL LOW COMPLEX 20 MIN: CPT

## 2021-06-16 PROCEDURE — 99233 SBSQ HOSP IP/OBS HIGH 50: CPT | Mod: GC

## 2021-06-16 PROCEDURE — 86769 SARS-COV-2 COVID-19 ANTIBODY: CPT

## 2021-06-16 PROCEDURE — 82962 GLUCOSE BLOOD TEST: CPT

## 2021-06-16 PROCEDURE — C9399: CPT

## 2021-06-16 RX ORDER — ACETAMINOPHEN 500 MG
2 TABLET ORAL
Qty: 0 | Refills: 0 | DISCHARGE
Start: 2021-06-16

## 2021-06-16 RX ORDER — MULTIVIT WITH MIN/MFOLATE/K2 340-15/3 G
1 POWDER (GRAM) ORAL ONCE
Refills: 0 | Status: COMPLETED | OUTPATIENT
Start: 2021-06-16 | End: 2021-06-16

## 2021-06-16 RX ORDER — METHOCARBAMOL 500 MG/1
1 TABLET, FILM COATED ORAL
Qty: 21 | Refills: 0
Start: 2021-06-16 | End: 2021-06-22

## 2021-06-16 RX ORDER — BACITRACIN ZINC 500 UNIT/G
1 OINTMENT IN PACKET (EA) TOPICAL ONCE
Refills: 0 | Status: COMPLETED | OUTPATIENT
Start: 2021-06-16 | End: 2021-06-16

## 2021-06-16 RX ORDER — SENNA PLUS 8.6 MG/1
2 TABLET ORAL
Qty: 0 | Refills: 0 | DISCHARGE
Start: 2021-06-16

## 2021-06-16 RX ORDER — GABAPENTIN 400 MG/1
0 CAPSULE ORAL
Qty: 0 | Refills: 0 | DISCHARGE

## 2021-06-16 RX ORDER — OXYCODONE HYDROCHLORIDE 5 MG/1
1 TABLET ORAL
Qty: 40 | Refills: 0
Start: 2021-06-16 | End: 2021-06-22

## 2021-06-16 RX ORDER — POLYETHYLENE GLYCOL 3350 17 G/17G
17 POWDER, FOR SOLUTION ORAL
Qty: 0 | Refills: 0 | DISCHARGE
Start: 2021-06-16

## 2021-06-16 RX ORDER — BACITRACIN ZINC 500 UNIT/G
1 OINTMENT IN PACKET (EA) TOPICAL
Qty: 0 | Refills: 0 | DISCHARGE
Start: 2021-06-16

## 2021-06-16 RX ADMIN — OXYCODONE HYDROCHLORIDE 10 MILLIGRAM(S): 5 TABLET ORAL at 11:05

## 2021-06-16 RX ADMIN — METHOCARBAMOL 500 MILLIGRAM(S): 500 TABLET, FILM COATED ORAL at 14:13

## 2021-06-16 RX ADMIN — Medication 81 MILLIGRAM(S): at 11:05

## 2021-06-16 RX ADMIN — OXYCODONE HYDROCHLORIDE 10 MILLIGRAM(S): 5 TABLET ORAL at 02:26

## 2021-06-16 RX ADMIN — Medication 10 MILLIGRAM(S): at 07:43

## 2021-06-16 RX ADMIN — OXYCODONE HYDROCHLORIDE 10 MILLIGRAM(S): 5 TABLET ORAL at 12:05

## 2021-06-16 RX ADMIN — Medication 50 MILLIGRAM(S): at 14:13

## 2021-06-16 RX ADMIN — Medication 1000 MILLIGRAM(S): at 05:15

## 2021-06-16 RX ADMIN — Medication 4: at 06:57

## 2021-06-16 RX ADMIN — Medication 1 BOTTLE: at 11:06

## 2021-06-16 RX ADMIN — OXYCODONE HYDROCHLORIDE 10 MILLIGRAM(S): 5 TABLET ORAL at 03:00

## 2021-06-16 RX ADMIN — OXYCODONE HYDROCHLORIDE 10 MILLIGRAM(S): 5 TABLET ORAL at 06:57

## 2021-06-16 RX ADMIN — LISINOPRIL 20 MILLIGRAM(S): 2.5 TABLET ORAL at 05:17

## 2021-06-16 RX ADMIN — Medication 4: at 12:55

## 2021-06-16 RX ADMIN — Medication 1000 MILLIGRAM(S): at 06:15

## 2021-06-16 RX ADMIN — Medication 1 APPLICATION(S): at 14:13

## 2021-06-16 RX ADMIN — METHOCARBAMOL 500 MILLIGRAM(S): 500 TABLET, FILM COATED ORAL at 05:15

## 2021-06-16 RX ADMIN — OXYCODONE HYDROCHLORIDE 10 MILLIGRAM(S): 5 TABLET ORAL at 07:57

## 2021-06-16 RX ADMIN — Medication 50 MILLIGRAM(S): at 05:15

## 2021-06-16 NOTE — PROGRESS NOTE ADULT - SUBJECTIVE AND OBJECTIVE BOX
Subjective/Interval events:  -No events overnight  -This am still with drain at surgical site- to be removed later today per primary team  -Largely asymptomatic except for no BM in 3-4 days now, no n/v and eating well     MEDICATIONS  (STANDING):  acetaminophen   Tablet .. 1000 milliGRAM(s) Oral every 8 hours  aspirin enteric coated 81 milliGRAM(s) Oral daily  BUpivacaine liposome 1.3% Injectable (no eMAR) 20 milliLiter(s) Local Injection once  dextrose 40% Gel 15 Gram(s) Oral once  dextrose 5%. 1000 milliLiter(s) (50 mL/Hr) IV Continuous <Continuous>  dextrose 5%. 1000 milliLiter(s) (100 mL/Hr) IV Continuous <Continuous>  dextrose 50% Injectable 25 Gram(s) IV Push once  dextrose 50% Injectable 12.5 Gram(s) IV Push once  dextrose 50% Injectable 25 Gram(s) IV Push once  glucagon  Injectable 1 milliGRAM(s) IntraMuscular once  insulin lispro (ADMELOG) corrective regimen sliding scale   SubCutaneous Before meals and at bedtime  lactated ringers. 1000 milliLiter(s) (130 mL/Hr) IV Continuous <Continuous>  lisinopril 20 milliGRAM(s) Oral daily  methocarbamol 500 milliGRAM(s) Oral every 8 hours  ondansetron   Disintegrating Tablet 4 milliGRAM(s) Oral every 6 hours  polyethylene glycol 3350 17 Gram(s) Oral daily  pregabalin 50 milliGRAM(s) Oral every 8 hours  senna 2 Tablet(s) Oral at bedtime    MEDICATIONS  (PRN):  HYDROmorphone  Injectable 0.5 milliGRAM(s) IV Push every 15 minutes PRN Breakthrough pain  magnesium hydroxide Suspension 30 milliLiter(s) Oral every 12 hours PRN Constipation  metoclopramide Injectable 10 milliGRAM(s) IV Push every 8 hours PRN nausea/vomiting  oxyCODONE    IR 10 milliGRAM(s) Oral every 4 hours PRN Severe Pain (7 - 10)  oxyCODONE    IR 5 milliGRAM(s) Oral every 4 hours PRN Moderate Pain (4 - 6)      Vital Signs Last 24 Hrs  T(C): 37.2 (16 Jun 2021 08:25), Max: 37.2 (16 Jun 2021 08:25)  T(F): 98.9 (16 Jun 2021 08:25), Max: 98.9 (16 Jun 2021 08:25)  HR: 104 (16 Jun 2021 08:25) (96 - 104)  BP: 110/69 (16 Jun 2021 08:25) (110/69 - 148/91)  BP(mean): 110 (16 Jun 2021 04:34) (100 - 110)  RR: 16 (16 Jun 2021 08:25) (15 - 17)  SpO2: 95% (16 Jun 2021 08:25) (95% - 97%)    PHYSICAL EXAM:  GENERAL: pleasant, NAD  NEURO: Aox3, intact strength/sensation all 4 ext   HEENT: clear op, mmm  NECK:  No JVD, no lymphadenopathy  CHEST/LUNG: Clear to auscultation bilaterally; No rales, rhonchi, wheezing. Normal work of breathing, not tachypneic  HEART: Regular rate and rhythm; No murmurs, rubs, or gallops  ABDOMEN: Soft, Nontender, Nondistended. Normoactive bowel sounds  EXTREMITIES:  No le edema  Back: Dressing in place, drain around brace around abd with serrosang drainage     LABS (reviewed)   -fsg in 200's here     ASSESSMENT AND PLAN: 66M with hx of chronic low back pain, HTN, type II DM, presents for elective L2-L4 laminectomy which occurred on 6/14 without complication, now postop day 1   #Postop laminectomy- with drain, wound care and pain control per primary  #Constipation, no n/v to suggest ileus- aggressive bowel regimen per primary team, enema PRN   #HTN- c/w home ACE  #Type II DM with mild hyperglycemia, a1c7- ISS while here, dc on orals     #DVT px: per primary team, not on px  #Diet: DM diet  #Dispo: home today    Patient was seen and examined by me at bedside    Greater than 35 minutes spent on total encounter; more than 50% of the visit was spent counseling and/or coordinating care by the attending physician.    Issa Currie MD [phone]

## 2021-06-16 NOTE — PROGRESS NOTE ADULT - ASSESSMENT
A/P: 66y Male s/p L2-L4 laminectomy on 06/14    - Stable  - Pain control inadequate overnight -- increased oxy to 5/10 mod/sev this AM  - Nausea control/Bowel regiment  - Home meds  - PT: pending eval  - WBAT  - Pull Gagr today for TOV  - Monitor drain output -- plan to keep drain today  - DVT ppx: SCDs    Ortho Pager [phone]
A/P: 66y Male s/p L2-L4 laminectomy on 06/14    - Stable  - Pain control adequate  - Nausea control/Bowel regiment  - Home meds  - PT: possible clearance for d/c home today  - WBAT  - Passed TOV  - Monitor drain output -- will monitor today  - DVT ppx: SCDs  - Possible d/c today pending PT and drain    Ortho Pager [phone]

## 2021-06-16 NOTE — DISCHARGE NOTE PROVIDER - NSDCCPCAREPLAN_GEN_ALL_CORE_FT
PRINCIPAL DISCHARGE DIAGNOSIS  Diagnosis: Spinal stenosis of lumbar region, unspecified whether neurogenic claudication present  Assessment and Plan of Treatment: L2-4 laminectomy

## 2021-06-16 NOTE — DISCHARGE NOTE PROVIDER - NSDCFUADDINST_GEN_ALL_CORE_FT
No strenuous activity (bending/twisting), heavy lifting, driving or returning to work until cleared by MD.  Change dressing daily with gauze/tape till post-op day #5, then leave incision open to air.  You may shower post-op day#5, keep incision clean and dry.   Try to have regular bowel movements, take stool softener or laxative if necessary.  May apply ice to affected areas to decrease swelling.  Call to schedule an appt with Dr. Falk for follow up, if you have staples or sutures they will be removed in office.  Contact your doctor if you experience: fever greater than 101.5, chills, chest pain, difficulty breathing, redness or excessive drainage around the incision, other concerns.  Follow up with your primary care provider.   No strenuous activity (bending/twisting), heavy lifting, driving or returning to work until cleared by MD.  Change dressing daily with gauze/tape till post-op day #5, then leave incision open to air.  You may shower post-op day#5, keep incision clean and dry.   Try to have regular bowel movements, take stool softener or laxative if necessary.  May apply ice to affected areas to decrease swelling.  Call to schedule an appt with Dr. Falk for follow up, if you have staples or sutures they will be removed in office.  Contact your doctor if you experience: fever greater than 101.5, chills, chest pain, difficulty breathing, redness or excessive drainage around the incision, other concerns.  Follow up with your primary care provider or endocrinologist to manage DM type II.  Apply Bacitracin once a day for 7 days for skin tear posterior trunk (possible from tape).

## 2021-06-16 NOTE — DISCHARGE NOTE PROVIDER - NSDCMRMEDTOKEN_GEN_ALL_CORE_FT
acetaminophen 500 mg oral tablet: 2 tab(s) orally every 8 hours for one week then as needed  DO not exceed 4000mg/day  aspirin 81 mg oral tablet: orally once a day  Januvia 100 mg oral tablet: 1 tab(s) orally once a day  lisinopril 20 mg oral tablet: 1 tab(s) orally once a day  metFORMIN 1000 mg oral tablet: 1 tab(s) orally 2 times a day  methocarbamol 500 mg oral tablet: 1 tab(s) orally every 8 hours  oxyCODONE 5 mg oral tablet: 1 to 2 tab(s) orally every 4 hours, As needed, Moderate to severe Pain (4 - 6) MDD:12  polyethylene glycol 3350 oral powder for reconstitution: 17 gram(s) orally once a day  pregabalin 50 mg oral capsule: 1 cap(s) orally every 8 hours MDD:3  senna oral tablet: 2 tab(s) orally once a day (at bedtime)   acetaminophen 500 mg oral tablet: 2 tab(s) orally every 8 hours for one week then as needed  DO not exceed 4000mg/day  aspirin 81 mg oral tablet: orally once a day  bacitracin 500 units/g topical ointment: 1 application topically once  Januvia 100 mg oral tablet: 1 tab(s) orally once a day  lisinopril 20 mg oral tablet: 1 tab(s) orally once a day  metFORMIN 1000 mg oral tablet: 1 tab(s) orally 2 times a day  methocarbamol 500 mg oral tablet: 1 tab(s) orally every 8 hours  oxyCODONE 5 mg oral tablet: 1 to 2 tab(s) orally every 4 hours, As needed, Moderate to severe Pain (4 - 6) MDD:12  polyethylene glycol 3350 oral powder for reconstitution: 17 gram(s) orally once a day  pregabalin 50 mg oral capsule: 1 cap(s) orally every 8 hours MDD:3  senna oral tablet: 2 tab(s) orally once a day (at bedtime)

## 2021-06-16 NOTE — DISCHARGE NOTE PROVIDER - HOSPITAL COURSE
Admitted 6/14/21  Surgery L2-4 laminectomy  Kailey-op Antibiotics  Pain control  DVT prophylaxis  OOB/Physical Therapy

## 2021-06-16 NOTE — DISCHARGE NOTE NURSING/CASE MANAGEMENT/SOCIAL WORK - PATIENT PORTAL LINK FT
You can access the FollowMyHealth Patient Portal offered by [facility] by registering at the following website: [email or web address]. By joining [employer]’s FollowMyHealth portal, you will also be able to view your health information using other applications (apps) compatible with our system.

## 2021-06-16 NOTE — PROGRESS NOTE ADULT - SUBJECTIVE AND OBJECTIVE BOX
Orthopaedic Spine Resident Note    S:  No acute overnight events.  Pain well controlled.    No fevers/chills/shortness of breath/chest pain/new neurologic complaints.    Vital Signs Last 24 Hrs  T(C): 37.2 (16 Jun 2021 08:25), Max: 37.2 (16 Jun 2021 08:25)  T(F): 98.9 (16 Jun 2021 08:25), Max: 98.9 (16 Jun 2021 08:25)  HR: 104 (16 Jun 2021 08:25) (94 - 104)  BP: 110/69 (16 Jun 2021 08:25) (110/69 - 148/91)  BP(mean): 110 (16 Jun 2021 04:34) (100 - 110)  RR: 16 (16 Jun 2021 08:25) (15 - 18)  SpO2: 95% (16 Jun 2021 08:25) (95% - 98%)    VSS  General: Well-appearing adult Male lying supine; NAD; A&Ox3  Back: Gauze/Tegaderm Dressing CDI  Hemovac drains x  155cc 24 hrs sanguinous output  Motor:  LLE- Hip Flex/Knee Ext/TA/EHL/GS 5/5 strength  RLE- Hip Flex/Knee Ext/TA/EHL/GS 5/5 strength  Sensory:  LLE- SILT L2-S1 dermatomes   RLE- SILT L2-S1 dermatomes  Vascular:  Feet WWP; DP 2+ bilaterally; Cap refill < 2 sec    I&O's Detail    15 Rayray 2021 07:01  -  16 Jun 2021 07:00  --------------------------------------------------------  IN:    Oral Fluid: 240 mL  Total IN: 240 mL    OUT:    Accordian (mL): 155 mL    Voided (mL): 450 mL  Total OUT: 605 mL    Total NET: -365 mL          Labs:                        12.7   9.06  )-----------( 240      ( 15 Rayray 2021 05:45 )             36.8     15 Rayray 2021 05:45    140    |  107    |  14     ----------------------------<  146    4.7     |  21     |  1.05     Ca    9.5        15 Rayray 2021 05:45

## 2021-06-16 NOTE — PROGRESS NOTE ADULT - SUBJECTIVE AND OBJECTIVE BOX
Ortho Note    Pt comfortable without complaints, pain controlled  Denies CP, SOB, N/V, numbness/tingling     Vital Signs Last 24 Hrs  T(C): 37.2 (06-16-21 @ 08:25), Max: 37.2 (06-16-21 @ 08:25)  T(F): 98.9 (06-16-21 @ 08:25), Max: 98.9 (06-16-21 @ 08:25)  HR: 104 (06-16-21 @ 08:25) (96 - 104)  BP: 110/69 (06-16-21 @ 08:25) (110/69 - 148/91)  BP(mean): 110 (06-16-21 @ 04:34) (110 - 110)  RR: 16 (06-16-21 @ 08:25) (15 - 16)  SpO2: 95% (06-16-21 @ 08:25) (95% - 96%)  AVSS    General: Pt Alert and oriented, NAD  DSG: lumbar new dry dressing and Tegaderm applied, HV x1 to suction to be removed in pm prior d/c home  Pulses: bilateral pedal 2+, wwp toes, cap refill < 3sec toes  Sensation: bilateral SILT  Motor: bilateral 5/5 EHL/FHL/TA/GS    06-15    140  |  107  |  14  ----------------------------<  146<H>  4.7   |  21<L>  |  1.05    Ca    9.5      15 Rayray 2021 05:45                            12.7   9.06  )-----------( 240      ( 15 Rayray 2021 05:45 )             36.8       A/P: 66y Male POD#2 s/p lami L2-4  - Stable, afebrile, nontoxic appearance, IS encouraged  - Pain Control: po meds  - DVT ppx: SCDs, ASA 81 mg daily  - PT, WBS: WBAT spinal precautions  -Bowel regimen: increase bowel regimen  -Dispo: home today in pm after PT clearance and drain removed.    Ortho Pager [phone]

## 2021-06-16 NOTE — DISCHARGE NOTE PROVIDER - CARE PROVIDER_API CALL
Nakul Falk)  [facility]  [address] [place] [address], [address] [place] [address]  Phone: [phone]  Fax: [phone]  Follow Up Time: 2 weeks

## 2021-06-16 NOTE — PROGRESS NOTE ADULT - ATTENDING COMMENTS
66 year old male s/p L2-L4 laminectomy POD#1.  Pain well-controlled.  Neurologically intact.  Continue drain to self-suction.  Mobilize with PT/OT.  Remove cooper.  Dispo planning.
66 year old male s/p L2-L4 laminectomy POD#2.  Pain well-controlled.  Neurologically intact.  Continue drain to self-suction.  Will recheck output this afternoon.  Continue PT.  Dispo planning.

## 2021-06-18 RX ORDER — DOCUSATE SODIUM 100 MG/1
100 CAPSULE ORAL TWICE DAILY
Qty: 28 | Refills: 0 | Status: ACTIVE | COMMUNITY
Start: 2021-06-18 | End: 1900-01-01

## 2021-06-18 RX ORDER — POLYETHYLENE GLYCOL 3350 17 G/17G
17 POWDER, FOR SOLUTION ORAL DAILY
Qty: 5 | Refills: 0 | Status: ACTIVE | COMMUNITY
Start: 2021-06-18 | End: 1900-01-01

## 2021-06-22 PROBLEM — B19.20 UNSPECIFIED VIRAL HEPATITIS C WITHOUT HEPATIC COMA: Chronic | Status: ACTIVE | Noted: 2021-06-11

## 2021-06-22 PROBLEM — E78.5 HYPERLIPIDEMIA, UNSPECIFIED: Chronic | Status: ACTIVE | Noted: 2021-06-11

## 2021-06-22 PROBLEM — I10 ESSENTIAL (PRIMARY) HYPERTENSION: Chronic | Status: ACTIVE | Noted: 2021-06-11

## 2021-06-22 PROBLEM — K21.9 GASTRO-ESOPHAGEAL REFLUX DISEASE WITHOUT ESOPHAGITIS: Chronic | Status: ACTIVE | Noted: 2021-06-11

## 2021-06-22 PROBLEM — M54.16 RADICULOPATHY, LUMBAR REGION: Chronic | Status: ACTIVE | Noted: 2021-06-11

## 2021-06-22 PROBLEM — K85.90 ACUTE PANCREATITIS WITHOUT NECROSIS OR INFECTION, UNSPECIFIED: Chronic | Status: ACTIVE | Noted: 2021-06-11

## 2021-06-22 PROBLEM — E11.9 TYPE 2 DIABETES MELLITUS WITHOUT COMPLICATIONS: Chronic | Status: ACTIVE | Noted: 2021-06-11

## 2021-06-28 DIAGNOSIS — B18.2 CHRONIC VIRAL HEPATITIS C: ICD-10-CM

## 2021-06-28 DIAGNOSIS — Z79.84 LONG TERM (CURRENT) USE OF ORAL HYPOGLYCEMIC DRUGS: ICD-10-CM

## 2021-06-28 DIAGNOSIS — E11.65 TYPE 2 DIABETES MELLITUS WITH HYPERGLYCEMIA: ICD-10-CM

## 2021-06-28 DIAGNOSIS — E78.5 HYPERLIPIDEMIA, UNSPECIFIED: ICD-10-CM

## 2021-06-28 DIAGNOSIS — M48.062 SPINAL STENOSIS, LUMBAR REGION WITH NEUROGENIC CLAUDICATION: ICD-10-CM

## 2021-06-28 DIAGNOSIS — Z87.891 PERSONAL HISTORY OF NICOTINE DEPENDENCE: ICD-10-CM

## 2021-06-28 DIAGNOSIS — K21.9 GASTRO-ESOPHAGEAL REFLUX DISEASE WITHOUT ESOPHAGITIS: ICD-10-CM

## 2021-06-28 DIAGNOSIS — K59.00 CONSTIPATION, UNSPECIFIED: ICD-10-CM

## 2021-06-28 DIAGNOSIS — Z79.82 LONG TERM (CURRENT) USE OF ASPIRIN: ICD-10-CM

## 2021-06-28 DIAGNOSIS — M54.16 RADICULOPATHY, LUMBAR REGION: ICD-10-CM

## 2021-06-28 DIAGNOSIS — M48.061 SPINAL STENOSIS, LUMBAR REGION WITHOUT NEUROGENIC CLAUDICATION: ICD-10-CM

## 2021-06-28 DIAGNOSIS — I10 ESSENTIAL (PRIMARY) HYPERTENSION: ICD-10-CM

## 2021-06-30 ENCOUNTER — APPOINTMENT (OUTPATIENT)
Dept: ORTHOPEDIC SURGERY | Facility: CLINIC | Age: 66
End: 2021-06-30
Payer: COMMERCIAL

## 2021-06-30 PROCEDURE — 99024 POSTOP FOLLOW-UP VISIT: CPT

## 2021-06-30 NOTE — HISTORY OF PRESENT ILLNESS
[___ Weeks Post Op] : [unfilled] weeks post op [de-identified] : s/p L2-L4 laminectomy [de-identified] : 66 year old male s/p L2-L4 laminectomy. Patient reports complete resolution of leg pain.  He is taking only Tylenol for pain (he stopped narcotics 10 days ago).  He is ambulating well.  He is happy with his progress.  He denies radicular pain, recent illness, fevers, numbness, weakness, balance problems, saddle anesthesia, urinary retention or fecal incontinence. [de-identified] : MSK:\par Lumbar spine:\par Incision c/d/i, sutures removed and steri-strips applied\par \par NEURO EXAM:\par Sensation \par Left L2  -  2/2            \par Left L3  -  2/2\par Left L4  -  2/2\par Left L5  -  2/2\par Left S1  -  2/2\par \par Right L2  -  2/2            \par Right L3  -  2/2\par Right L4  -  2/2\par Right L5  -  2/2\par Right S1  -  2/2\par \par Motor: \par Left L2 (hip flexion)                            5/5                \par Left L3 (knee extension)                   5/5                \par Left L4 (ankle dorsiflexion)                 5/5                \par Left L5 (long toe extensor)                5/5                \par Left S1 (ankle plantar flexion)           5/5\par \par Right L2 (hip flexion)                            5/5                \par Right L3 (knee extension)                   5/5                \par Right L4 (ankle dorsiflexion)                 5/5                \par Right L5 (long toe extensor)                5/5                \par Right S1 (ankle plantar flexion)           5/5 [de-identified] : 66 year old male 2 weeks s/p L2-L4 laminectomy [de-identified] : Patient is progressing well.  He has complete resolution of leg pain.  Incision healed.  He is only taking Tylenol for pain.  He is mobilizing well.   He will continue no heavy lifting twisting or bending.  He will followup in 4 weeks. We discussed red flag symptoms that would require emergent evaluation. He knows to call with any questions or concerns or if his symptoms acutely worsen.

## 2021-07-28 ENCOUNTER — APPOINTMENT (OUTPATIENT)
Dept: ORTHOPEDIC SURGERY | Facility: CLINIC | Age: 66
End: 2021-07-28
Payer: COMMERCIAL

## 2021-07-28 PROCEDURE — 99024 POSTOP FOLLOW-UP VISIT: CPT

## 2021-07-28 NOTE — HISTORY OF PRESENT ILLNESS
[___ Weeks Post Op] : [unfilled] weeks post op [de-identified] : s/p L2-L4 laminectomy [de-identified] : 66 year old male s/p L2-L4 laminectomy. Patient reports no pain.  He is not taking any medications for pain.  He says he walks at least a mile a day now without issue.  He denies radicular pain, recent illness, fevers, numbness, weakness, balance problems, saddle anesthesia, urinary retention or fecal incontinence. [de-identified] : MSK:\par Lumbar spine:\par Incision well healed\par \par NEURO EXAM:\par Sensation \par Left L2  -  2/2            \par Left L3  -  2/2\par Left L4  -  2/2\par Left L5  -  2/2\par Left S1  -  2/2\par \par Right L2  -  2/2            \par Right L3  -  2/2\par Right L4  -  2/2\par Right L5  -  2/2\par Right S1  -  2/2\par \par Motor: \par Left L2 (hip flexion)                            5/5                \par Left L3 (knee extension)                   5/5                \par Left L4 (ankle dorsiflexion)                 5/5                \par Left L5 (long toe extensor)                5/5                \par Left S1 (ankle plantar flexion)           5/5\par \par Right L2 (hip flexion)                            5/5                \par Right L3 (knee extension)                   5/5                \par Right L4 (ankle dorsiflexion)                 5/5                \par Right L5 (long toe extensor)                5/5                \par Right S1 (ankle plantar flexion)           5/5 [de-identified] : 66 year old male 6 weeks s/p L2-L4 laminectomy [de-identified] : Patient has resolution of pain.  He is neurologically intact.  His incision is well-healed.  He is walking at least a mile a day without issue.  He is very happy with his progress.  He will return to work on August 16, 2021 without restriction.  He will followup in 3-4 months.  We discussed red flag symptoms that would require emergent evaluation. He knows to call with any questions or concerns or if his symptoms acutely worsen.

## 2021-10-27 ENCOUNTER — APPOINTMENT (OUTPATIENT)
Dept: ORTHOPEDIC SURGERY | Facility: CLINIC | Age: 66
End: 2021-10-27

## 2021-11-27 RX ORDER — CYCLOBENZAPRINE HYDROCHLORIDE 5 MG/1
5 TABLET, FILM COATED ORAL 3 TIMES DAILY
Qty: 21 | Refills: 1 | Status: ACTIVE | COMMUNITY
Start: 2021-11-27 | End: 1900-01-01

## 2021-11-30 ENCOUNTER — APPOINTMENT (OUTPATIENT)
Dept: ORTHOPEDIC SURGERY | Facility: CLINIC | Age: 66
End: 2021-11-30
Payer: COMMERCIAL

## 2021-11-30 DIAGNOSIS — M48.062 SPINAL STENOSIS, LUMBAR REGION WITH NEUROGENIC CLAUDICATION: ICD-10-CM

## 2021-11-30 DIAGNOSIS — M54.50 LOW BACK PAIN, UNSPECIFIED: ICD-10-CM

## 2021-11-30 PROCEDURE — 99214 OFFICE O/P EST MOD 30 MIN: CPT

## 2021-11-30 NOTE — ASSESSMENT
[FreeTextEntry1] : 66 year old male 5.5 months s/p L2-L4 laminectomy returns for followup with acute episode of low back pain.  He says he was traveling to [facility] for Thanksgiving holiday when he had low back pain.  He denies radicular pain and is neurologically intact.  Since returning to [facility] his pain has significantly improved.  He will continue Diclofenac and cyclobenzaprine as needed.  He will followup if his pain fails to resolve in the next 2 weeks.  Otherwise he will followup in 3 months.  We discussed red flag symptoms that would require emergent evaluation. He knows to call with any questions or concerns or if his symptoms acutely worsen.

## 2021-11-30 NOTE — PHYSICAL EXAM
[de-identified] : General: No acute distress, conversant, well-nourished.\par Head: Normocephalic, atraumatic\par Neck: trachea midline, FROM\par Heart: normotensive and normal rate and rhythm\par Lungs: No labored breathing\par Skin: No abrasions, no rashes, no edema\par Psych: Alert and oriented to person, place and time\par Extremities: no peripheral edema or digital cyanosis\par Gait: Normal gait. Can perform tandem gait.  \par Vascular: warm and well perfused distally, palpable distal pulses\par \par MSK:\par Lumbar spine:\par Incision well healed\par No tenderness to palpation.  No step-off, no deformity.\par \par NEURO EXAM:\par Sensation \par Left L2  -  2/2            \par Left L3  -  2/2\par Left L4  -  2/2\par Left L5  -  2/2\par Left S1  -  2/2\par \par Right L2  -  2/2            \par Right L3  -  2/2\par Right L4  -  2/2\par Right L5  -  2/2\par Right S1  -  2/2\par \par Motor: \par Left L2 (hip flexion)                            5/5                \par Left L3 (knee extension)                   5/5                \par Left L4 (ankle dorsiflexion)                 5/5                \par Left L5 (long toe extensor)                5/5                \par Left S1 (ankle plantar flexion)           5/5\par \par Right L2 (hip flexion)                            5/5                \par Right L3 (knee extension)                   5/5                \par Right L4 (ankle dorsiflexion)                 5/5                \par Right L5 (long toe extensor)                5/5                \par Right S1 (ankle plantar flexion)           5/5\par \par Reflexes: Normal and symmetric\par Negative clonus.  Down-going Babinski.   [de-identified] : Lumbar MRI shows lumbar spondylosis.  L3-L4 moderate-severe central stenosis.  \par \par Lumbar flex and extension radiographs obtained in the office today shows no dynamic instability. There is lumbar spondylosis and loss of disc height at L5-S1.

## 2021-11-30 NOTE — ASSESSMENT
[FreeTextEntry1] : 66 year old male 5.5 months s/p L2-L4 laminectomy returns for followup with acute episode of low back pain.  He says he was traveling to [facility] for Thanksgiving holiday when he had low back pain.  He denies radicular pain and is neurologically intact.  Since returning to [facility] his pain has significantly improved.  He will continue Diclofenac and cyclobenzaprine as needed.  He will followup if his pain fails to resolve in the next 2 weeks.  Otherwise he will followup in 3 months.  We discussed red flag symptoms that would require emergent evaluation. He knows to call with any questions or concerns or if his symptoms acutely worsen.

## 2021-11-30 NOTE — HISTORY OF PRESENT ILLNESS
[de-identified] : 66 year old male 5.5 months s/p L2-L4 laminectomy returns for followup with acute episode of low back pain.  He says he was traveling to [facility] for Thanksgiving holiday when he had low back pain.  He denies radicular pain, recent illness, fevers, numbness, weakness, balance problems, saddle anesthesia, urinary retention or fecal incontinence.  He was sent muscle relaxants which helped.  His pain has significantly improved.

## 2021-11-30 NOTE — PHYSICAL EXAM
[de-identified] : General: No acute distress, conversant, well-nourished.\par Head: Normocephalic, atraumatic\par Neck: trachea midline, FROM\par Heart: normotensive and normal rate and rhythm\par Lungs: No labored breathing\par Skin: No abrasions, no rashes, no edema\par Psych: Alert and oriented to person, place and time\par Extremities: no peripheral edema or digital cyanosis\par Gait: Normal gait. Can perform tandem gait.  \par Vascular: warm and well perfused distally, palpable distal pulses\par \par MSK:\par Lumbar spine:\par Incision well healed\par No tenderness to palpation.  No step-off, no deformity.\par \par NEURO EXAM:\par Sensation \par Left L2  -  2/2            \par Left L3  -  2/2\par Left L4  -  2/2\par Left L5  -  2/2\par Left S1  -  2/2\par \par Right L2  -  2/2            \par Right L3  -  2/2\par Right L4  -  2/2\par Right L5  -  2/2\par Right S1  -  2/2\par \par Motor: \par Left L2 (hip flexion)                            5/5                \par Left L3 (knee extension)                   5/5                \par Left L4 (ankle dorsiflexion)                 5/5                \par Left L5 (long toe extensor)                5/5                \par Left S1 (ankle plantar flexion)           5/5\par \par Right L2 (hip flexion)                            5/5                \par Right L3 (knee extension)                   5/5                \par Right L4 (ankle dorsiflexion)                 5/5                \par Right L5 (long toe extensor)                5/5                \par Right S1 (ankle plantar flexion)           5/5\par \par Reflexes: Normal and symmetric\par Negative clonus.  Down-going Babinski.   [de-identified] : Lumbar MRI shows lumbar spondylosis.  L3-L4 moderate-severe central stenosis.  \par \par Lumbar flex and extension radiographs obtained in the office today shows no dynamic instability. There is lumbar spondylosis and loss of disc height at L5-S1.

## 2021-11-30 NOTE — HISTORY OF PRESENT ILLNESS
[de-identified] : 66 year old male 5.5 months s/p L2-L4 laminectomy returns for followup with acute episode of low back pain.  He says he was traveling to [facility] for Thanksgiving holiday when he had low back pain.  He denies radicular pain, recent illness, fevers, numbness, weakness, balance problems, saddle anesthesia, urinary retention or fecal incontinence.  He was sent muscle relaxants which helped.  His pain has significantly improved.

## 2021-12-27 ENCOUNTER — RX RENEWAL (OUTPATIENT)
Age: 66
End: 2021-12-27

## 2022-01-30 ENCOUNTER — RX RENEWAL (OUTPATIENT)
Age: 67
End: 2022-01-30

## 2022-01-30 RX ORDER — DICLOFENAC SODIUM 75 MG/1
75 TABLET, DELAYED RELEASE ORAL
Qty: 60 | Refills: 0 | Status: ACTIVE | COMMUNITY
Start: 2021-11-27 | End: 1900-01-01

## 2022-06-03 NOTE — H&P ADULT - PROBLEM SELECTOR PROBLEM 6
Review of Systems/Medical History  Patient summary reviewed  Chart reviewed  No history of anesthetic complications     Cardiovascular   Pulmonary  Smoker cigarette smoker  ,        GI/Hepatic    GERD well controlled, Bowel prep            Endo/Other  History of thyroid disease , hypothyroidism,   Obesity    GYN  Negative gynecology ROS          Hematology  Negative hematology ROS      Musculoskeletal  Back pain , cervical pain and lumbar pain, Sciatica,        Neurology  Negative neurology ROS      Psychology   Negative psychology ROS              Physical Exam    Airway    Mallampati score: II  TM Distance: >3 FB  Neck ROM: full     Dental   No notable dental hx     Cardiovascular  Rhythm: regular, Rate: normal, Cardiovascular exam normal    Pulmonary  Pulmonary exam normal Breath sounds clear to auscultation,     Other Findings        Anesthesia Plan  ASA Score- 2     Anesthesia Type- general with ASA Monitors  Additional Monitors:   Airway Plan: LMA  Plan Factors-    Chart reviewed  Patient summary reviewed  Patient not instructed to abstain from smoking on day of procedure  Patient did not smoke on day of surgery  Induction- intravenous  Postoperative Plan-     Informed Consent- Anesthetic plan and risks discussed with patient  I personally reviewed this patient with the CRNA  Discussed and agreed on the Anesthesia Plan with the CRNA  Maureen Garcia Gastric reflux

## 2024-04-28 NOTE — H&P ADULT - NSHPSOCIALHISTORY_GEN_ALL_CORE
Pt is a former smoker. Pt is a former smoker.  He has a h/o EtOH use, but has not had any EtOH since 1985. Detail Level: Zone Private car
